# Patient Record
Sex: FEMALE | Race: WHITE
[De-identification: names, ages, dates, MRNs, and addresses within clinical notes are randomized per-mention and may not be internally consistent; named-entity substitution may affect disease eponyms.]

---

## 2017-06-06 ENCOUNTER — HOSPITAL ENCOUNTER (INPATIENT)
Dept: HOSPITAL 18 - NAV ACUTE | Age: 62
LOS: 4 days | Discharge: HOME | DRG: 560 | End: 2017-06-10
Attending: INTERNAL MEDICINE | Admitting: INTERNAL MEDICINE
Payer: MEDICARE

## 2017-06-06 VITALS — BODY MASS INDEX: 61.4 KG/M2

## 2017-06-06 DIAGNOSIS — E66.01: ICD-10-CM

## 2017-06-06 DIAGNOSIS — E11.9: ICD-10-CM

## 2017-06-06 DIAGNOSIS — I11.0: ICD-10-CM

## 2017-06-06 DIAGNOSIS — M79.7: ICD-10-CM

## 2017-06-06 DIAGNOSIS — S32.048D: Primary | ICD-10-CM

## 2017-06-06 DIAGNOSIS — M62.830: ICD-10-CM

## 2017-06-06 DIAGNOSIS — I25.10: ICD-10-CM
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
## 2017-06-06 DIAGNOSIS — I50.30: ICD-10-CM

## 2017-06-06 PROCEDURE — 36416 COLLJ CAPILLARY BLOOD SPEC: CPT

## 2017-06-06 PROCEDURE — 85025 COMPLETE CBC W/AUTO DIFF WBC: CPT

## 2017-06-06 PROCEDURE — 80053 COMPREHEN METABOLIC PANEL: CPT

## 2017-06-06 RX ADMIN — INSULIN LISPRO SCH: 100 INJECTION, SOLUTION INTRAVENOUS; SUBCUTANEOUS at 20:57

## 2017-06-06 RX ADMIN — HYDROCODONE BITARTRATE AND ACETAMINOPHEN PRN TAB: 10; 325 TABLET ORAL at 20:59

## 2017-06-07 LAB
ALBUMIN SERPL BCG-MCNC: 3.8 G/DL (ref 3.5–5)
ALP SERPL-CCNC: 151 U/L (ref 40–150)
ALT SERPL W P-5'-P-CCNC: 20 U/L (ref 8–55)
ANION GAP SERPL CALC-SCNC: 19 MMOL/L (ref 10–20)
AST SERPL-CCNC: 30 U/L (ref 5–34)
BASOPHILS # BLD AUTO: 0.1 THOU/UL (ref 0–0.2)
BASOPHILS NFR BLD AUTO: 1.8 % (ref 0–1)
BILIRUB SERPL-MCNC: 0.6 MG/DL (ref 0.2–1.2)
BUN SERPL-MCNC: 22 MG/DL (ref 9.8–20.1)
CALCIUM SERPL-MCNC: 10 MG/DL (ref 7.8–10.44)
CHLORIDE SERPL-SCNC: 103 MMOL/L (ref 98–107)
CO2 SERPL-SCNC: 23 MMOL/L (ref 22–29)
CREAT CL PREDICTED SERPL C-G-VRATE: 179 ML/MIN (ref 70–130)
EOSINOPHIL # BLD AUTO: 0.2 THOU/UL (ref 0–0.7)
EOSINOPHIL NFR BLD AUTO: 3 % (ref 0–10)
GLOBULIN SER CALC-MCNC: 4.1 G/DL (ref 2.4–3.5)
GLUCOSE SERPL-MCNC: 120 MG/DL (ref 70–105)
HGB BLD-MCNC: 14.3 G/DL (ref 12–16)
LYMPHOCYTES # BLD AUTO: 3.7 THOU/UL (ref 1.2–3.4)
LYMPHOCYTES NFR BLD AUTO: 50 % (ref 21–51)
MCH RBC QN AUTO: 30.9 PG (ref 27–31)
MCV RBC AUTO: 99.7 FL (ref 81–99)
MONOCYTES # BLD AUTO: 0.6 THOU/UL (ref 0.11–0.59)
MONOCYTES NFR BLD AUTO: 8.3 % (ref 0–10)
NEUTROPHILS # BLD AUTO: 2.7 THOU/UL (ref 1.4–6.5)
NEUTROPHILS NFR BLD AUTO: 36.9 % (ref 42–75)
PLATELET # BLD AUTO: 226 THOU/UL (ref 130–400)
POTASSIUM SERPL-SCNC: 4.3 MMOL/L (ref 3.5–5.1)
RBC # BLD AUTO: 4.64 MILL/UL (ref 4.2–5.4)
SODIUM SERPL-SCNC: 141 MMOL/L (ref 136–145)
WBC # BLD AUTO: 7.4 THOU/UL (ref 4.8–10.8)

## 2017-06-07 RX ADMIN — MOMETASONE FUROATE AND FORMOTEROL FUMARATE DIHYDRATE SCH PUFF: 200; 5 AEROSOL RESPIRATORY (INHALATION) at 05:29

## 2017-06-07 RX ADMIN — INSULIN DETEMIR SCH UNIT: 100 INJECTION, SOLUTION SUBCUTANEOUS at 21:55

## 2017-06-07 RX ADMIN — INSULIN LISPRO SCH UNITS: 100 INJECTION, SOLUTION INTRAVENOUS; SUBCUTANEOUS at 21:54

## 2017-06-07 RX ADMIN — INSULIN LISPRO SCH UNITS: 100 INJECTION, SOLUTION INTRAVENOUS; SUBCUTANEOUS at 08:44

## 2017-06-07 RX ADMIN — HYDROCODONE BITARTRATE AND ACETAMINOPHEN PRN TAB: 10; 325 TABLET ORAL at 05:29

## 2017-06-07 RX ADMIN — MOMETASONE FUROATE AND FORMOTEROL FUMARATE DIHYDRATE SCH PUFF: 200; 5 AEROSOL RESPIRATORY (INHALATION) at 18:05

## 2017-06-07 RX ADMIN — HYDROCODONE BITARTRATE AND ACETAMINOPHEN PRN TAB: 10; 325 TABLET ORAL at 21:55

## 2017-06-07 RX ADMIN — INSULIN LISPRO SCH UNITS: 100 INJECTION, SOLUTION INTRAVENOUS; SUBCUTANEOUS at 15:04

## 2017-06-08 RX ADMIN — MOMETASONE FUROATE AND FORMOTEROL FUMARATE DIHYDRATE SCH PUFF: 200; 5 AEROSOL RESPIRATORY (INHALATION) at 21:12

## 2017-06-08 RX ADMIN — INSULIN DETEMIR SCH UNIT: 100 INJECTION, SOLUTION SUBCUTANEOUS at 21:16

## 2017-06-08 RX ADMIN — INSULIN LISPRO SCH UNITS: 100 INJECTION, SOLUTION INTRAVENOUS; SUBCUTANEOUS at 14:44

## 2017-06-08 RX ADMIN — HYDROCODONE BITARTRATE AND ACETAMINOPHEN PRN TAB: 10; 325 TABLET ORAL at 23:01

## 2017-06-08 RX ADMIN — INSULIN LISPRO SCH UNITS: 100 INJECTION, SOLUTION INTRAVENOUS; SUBCUTANEOUS at 21:14

## 2017-06-08 RX ADMIN — INSULIN LISPRO SCH UNITS: 100 INJECTION, SOLUTION INTRAVENOUS; SUBCUTANEOUS at 08:32

## 2017-06-08 RX ADMIN — HYDROCODONE BITARTRATE AND ACETAMINOPHEN PRN TAB: 10; 325 TABLET ORAL at 11:40

## 2017-06-08 RX ADMIN — MOMETASONE FUROATE AND FORMOTEROL FUMARATE DIHYDRATE SCH PUFF: 200; 5 AEROSOL RESPIRATORY (INHALATION) at 06:20

## 2017-06-08 RX ADMIN — HYDROCODONE BITARTRATE AND ACETAMINOPHEN PRN TAB: 10; 325 TABLET ORAL at 17:01

## 2017-06-08 NOTE — HP
HISTORY OF PRESENT ILLNESS:  The patient is a 61-year-old white female with a history of a recent fa
ll with subsequent recurrent back pain and evaluation finding a compression fracture of L4.  She gayathri
led on conservative treatment at home, required admission to [facility] on 05/28/2017 because of sev
ere pain.  She has comorbidities of morbid obesity, weighing greater than 300 pounds and systemic poornima
pus erythematosus and fibromyalgia with chronic pain.  She also has type 2 diabetes, hypertension, d
iastolic congestive heart failure, and coronary artery disease.  She, however, had been doing very w
ell, living alone at home with her mother.  Prior to the fall when she slipped out of her wheelchair
, she normally is ambulatory in her wheelchair and back and forth able to transfer, but because of h
er severe pain, has been having difficulty that has required treatment with fentanyl patch and oral 
hydrocodone for control of pain.  She is improving now with therapy and feels that she is comfortabl
e at rest and is able to transfer and is hoping to be discharged home in the next several days after
 therapy.

 

PAST MEDICAL HISTORY:  As mentioned above is remarkable for coronary artery disease, but with no ang
ioplasty or bypass.  She also has a history of congestive heart failure and is well controlled on me
dications, history of asthma with no recent exacerbation, the above-mentioned history of lupus and f
ibromyalgia with occasional steroid pulses for exacerbation none recently, above-mentioned history a
lso has severe morbid obesity, greater than 400 pound weight in the past, now 320, type 2 diabetes a
s well as hyperlipidemia and hypertension.

 

PAST SURGICAL HISTORY:  Positive for bilateral tubal, cholecystectomy, and hernia repair.

 

SOCIAL HISTORY:  She lives, like I said, at home with her mother.  She is a nonsmoker, nondrinker.

 

REVIEW OF SYSTEMS:  HEENT:  She denies change in vision or hearing, hoarseness or dysphagia.  Pulmon
gaby:  She denies cough, sputum production, wheezing, shortness of breath at this time.  Cardiovascul
ar:  She denies any chest pain, palpitations, orthopnea, paroxysmal nocturnal dyspnea.  Gastrointest
inal:  She denies nausea, vomiting, diarrhea, constipation, abdominal pain.  Genitourinary:  Denies 
dysuria, hematuria, and nocturia.  Musculoskeletal:  She has severe lower back pain at rest and with
 movement, which has improved with fentanyl patch and Norco, but is still present.  She has no numbn
ess or tingling in her arms or legs or radicular pain.  Neurologic:  She denies any change in vision
 or hearing, speech or swallowing.

 

LABORATORY DATA:  Shows her to have white count 7400, hematocrit 46, hemoglobin 14, sodium 141, pota
ssium 4.3, chloride 103, bicarbonate 23, BUN 22, creatinine 0.87, glucose 120, calcium 10.0, total b
ilirubin 0.6, AST 30, ALT 20, alkaline phosphatase today is 151, albumin 3.8, and globulin 4.1.

 

PHYSICAL EXAMINATION:

GENERAL:  Shows morbidly obese white female lying in the bed, appears to be in no distress at this t
rodolfo, and comfortable in her bed.

VITAL SIGNS:  Shown to have blood pressure of 120/60, temperature is 96.2, pulse 63, respirations 20
, O2 sats 92% on 2.5 liters.

HEENT:  Pupils are equal, round, and react to light and accommodation.  Sclerae are anicteric.  Conj
unctivae pale.  Oral mucous membranes well hydrated.

NECK:  Supple.  There are no nodes or masses.  JVP is not elevated.

LUNGS:  Clear.

CARDIAC:  Examination showed regular rhythm.  No gallops or murmurs.

ABDOMEN:  Morbidly obese, but no masses or organomegaly.  Good bowel sounds.

SKIN/EXTREMITIES:  Show no edema, clubbing, cyanosis.  There are some palpable spasms over the lower
 back and tenderness to palpation on the lower lumbar spine.

NEUROLOGICAL:  Cranial nerves intact.  Deep tendon reflexes 2+ equal, absent Babinskis.

 

ASSESSMENT:  The patient is a 61-year-old morbidly obese white female with multiple comorbidities of
 hypertension, diabetes, lupus and fibromyalgia as well as asthma who appears to be stable at this t
rodolfo except for severe back pain which is improved with a fentanyl patch and Norco.  She states that 
prior to this she was able to live independently with her mother at home, ambulating with a wheelcha
ir and able to transfer without difficulty.  She feels that she is progressing back with that and wi
ll continue on her home medications of atenolol 100 mg daily, Symbicort 160/4.5 two puffs twice shraddha
y, amlodipine 10 mg daily, albuterol inhaler 2 puffs every 6 h., vitamin D 5000 units daily, Nexium 
over the counter 20 mg daily, furosemide 20 mg daily, gabapentin 600 three times daily, hydrocodone 
10/325 1-2 every 6 hours, lispro 5 units subcu t.i.d., Levemir 25 units nightly, oxybutynin 5 mg kalyan
ly, Paxil 40 mg daily, pravastatin 40 mg daily, Ambien 10 mg as needed and a new medication of fenta
nyl 12 mcg patch every 3 days.  She will be started on physical therapy and occupational therapy to 
hopefully improve to the point that she can be discharged home.  Maintain ADLs in the next several d
ays.  She will be monitored closely with Accu-Cheks and consideration may be given to Lovenox inject
ions if she does not move better in the next several days.

## 2017-06-08 NOTE — PRG
DATE OF SERVICE:  06/08/2017

 

SUBJECTIVE:  The patient lying in bed, resting well with a Fentanyl patch and hydrocodone 10/325 two
 q.6 h. She states she had significant pain yesterday on the 1 hydrocodone, but had relief with 2 an
d therefore is ready for therapy today.  She has no other complaints of nausea, vomiting, chest pain
, shortness of breath or palpitations.

 

OBJECTIVE:  Blood pressure 177/80, O2 sats is 97%, respirations 18, temperature 96, pulse 56.  Lungs
 are clear.  Cardiac examination showed regular rhythm.  Abdomen is soft and nontender.  Back shows 
palpable spasms and tenderness to palpation of the lumbar spine.

 

ASSESSMENT:

1.  Compression fracture of L4 with current muscle spasms, controlled with hydrocodone and fentanyl 
patch.  Will start on tizanidine 4 mg twice daily to see if this would help with the spasms.

2.  Type 2 diabetes, controlled at this time with Accu-Cheks running from 130-168 on home medication
s of Humalog before meals and Levemir at night.

3.  Fibromyalgia and lupus, asymptomatic at this time.

4.  Diastolic congestive heart failure and hypertension, well controlled.

 

PLAN:  Continue on increased hydrocodone 10/325 two every 6 hours as needed, continued Fentanyl patc
h, starts tizanidine 4 mg twice daily, start Nystatin cream to perineal area for monilia.

## 2017-06-09 RX ADMIN — INSULIN LISPRO SCH UNITS: 100 INJECTION, SOLUTION INTRAVENOUS; SUBCUTANEOUS at 14:30

## 2017-06-09 RX ADMIN — INSULIN LISPRO SCH UNITS: 100 INJECTION, SOLUTION INTRAVENOUS; SUBCUTANEOUS at 20:50

## 2017-06-09 RX ADMIN — INSULIN LISPRO SCH UNITS: 100 INJECTION, SOLUTION INTRAVENOUS; SUBCUTANEOUS at 08:40

## 2017-06-09 RX ADMIN — MOMETASONE FUROATE AND FORMOTEROL FUMARATE DIHYDRATE SCH PUFF: 200; 5 AEROSOL RESPIRATORY (INHALATION) at 18:12

## 2017-06-09 RX ADMIN — MOMETASONE FUROATE AND FORMOTEROL FUMARATE DIHYDRATE SCH PUFF: 200; 5 AEROSOL RESPIRATORY (INHALATION) at 06:29

## 2017-06-09 RX ADMIN — HYDROCODONE BITARTRATE AND ACETAMINOPHEN PRN TAB: 10; 325 TABLET ORAL at 08:36

## 2017-06-09 RX ADMIN — HYDROCODONE BITARTRATE AND ACETAMINOPHEN PRN TAB: 10; 325 TABLET ORAL at 20:49

## 2017-06-09 RX ADMIN — INSULIN DETEMIR SCH UNIT: 100 INJECTION, SOLUTION SUBCUTANEOUS at 20:49

## 2017-06-09 RX ADMIN — HYDROCODONE BITARTRATE AND ACETAMINOPHEN PRN TAB: 10; 325 TABLET ORAL at 14:29

## 2017-06-09 NOTE — PRG
DATE OF SERVICE:  06/09/2017

 

SUBJECTIVE:  Patient is an unfortunate 61-year-old white female with multiple medical problems inclu
ding morbid obesity, type 2 diabetes, fibromyalgia, lupus, diastolic congestive heart failure and hy
pertension, who has fallen and suffered a fracture of L4 with recurrent muscle spasms, treated at [facility], evaluated by Neurosurgery with no surgical relief.  Possible discharge to the SNF here on 
fentanyl patch and hydrocodone 10/325 two q.6 h.  She has been on this, has been cooperating with kana coffman and in fact, therapy states that she is stable for discharge and she walked 100 feet with a ro
lling walker with only standby assistance, independent and is ready to be discharge to her home.  Arya niño will be discharged tomorrow to her home.  She has prescriptions for fentanyl and hydrocodone given
 by her previous physician.  Prescription for tizanidine has been sent to her pharmacy and she has h
er other home medications.

 

OBJECTIVE:

VITAL SIGNS:  At this time appeared to be stable with O2 sat of 92% at 2.5 L, respirations 20, pulse
 57, afebrile, blood pressure 110/55.

LUNGS:  Clear.

CARDIAC:  Regular rhythm.

ABDOMEN:  Soft and nontender.

SKIN and EXTREMITIES:  Show morbid obesity with tenderness in the lower back, but the patient is amb
ulating.

 

ASSESSMENT:

1.  Compression fracture of L4, controlled pain.

2.  Increasing physical conditioning with ability to maintain ADLs.

3.  Chronic type 2 diabetes, well controlled with Accu-Cheks ranging from 133 to 152.

4.  Hypertension, controlled to goal.

5.  Diastolic congestive heart failure, appears compensated.

 

PLAN:  Discharge in the a.m.  Follow up with PCP next week.

## 2017-06-10 VITALS — SYSTOLIC BLOOD PRESSURE: 128 MMHG | TEMPERATURE: 97.2 F | DIASTOLIC BLOOD PRESSURE: 57 MMHG

## 2017-06-10 RX ADMIN — HYDROCODONE BITARTRATE AND ACETAMINOPHEN PRN TAB: 10; 325 TABLET ORAL at 06:52

## 2017-06-10 RX ADMIN — MOMETASONE FUROATE AND FORMOTEROL FUMARATE DIHYDRATE SCH PUFF: 200; 5 AEROSOL RESPIRATORY (INHALATION) at 06:28

## 2017-06-10 RX ADMIN — INSULIN LISPRO SCH UNITS: 100 INJECTION, SOLUTION INTRAVENOUS; SUBCUTANEOUS at 08:04

## 2018-01-28 ENCOUNTER — HOSPITAL ENCOUNTER (EMERGENCY)
Dept: HOSPITAL 92 - ERS | Age: 63
Discharge: HOME | End: 2018-01-28
Payer: MEDICARE

## 2018-01-28 DIAGNOSIS — E78.5: ICD-10-CM

## 2018-01-28 DIAGNOSIS — F32.9: ICD-10-CM

## 2018-01-28 DIAGNOSIS — I25.10: ICD-10-CM

## 2018-01-28 DIAGNOSIS — E11.9: ICD-10-CM

## 2018-01-28 DIAGNOSIS — F41.9: ICD-10-CM

## 2018-01-28 DIAGNOSIS — J45.909: ICD-10-CM

## 2018-01-28 DIAGNOSIS — R07.2: Primary | ICD-10-CM

## 2018-01-28 LAB
ALBUMIN SERPL BCG-MCNC: 3.6 G/DL (ref 3.5–5)
ALP SERPL-CCNC: 119 U/L (ref 40–150)
ALT SERPL W P-5'-P-CCNC: 15 U/L (ref 8–55)
ANION GAP SERPL CALC-SCNC: 14 MMOL/L (ref 10–20)
APTT PPP: 29.5 SEC (ref 22.9–36.1)
AST SERPL-CCNC: 36 U/L (ref 5–34)
BASOPHILS # BLD AUTO: 0.1 THOU/UL (ref 0–0.2)
BASOPHILS NFR BLD AUTO: 1.2 % (ref 0–1)
BILIRUB SERPL-MCNC: 0.4 MG/DL (ref 0.2–1.2)
BUN SERPL-MCNC: 18 MG/DL (ref 9.8–20.1)
CALCIUM SERPL-MCNC: 9.3 MG/DL (ref 7.8–10.44)
CHLORIDE SERPL-SCNC: 101 MMOL/L (ref 98–107)
CK MB SERPL-MCNC: 0.8 NG/ML (ref 0–6.6)
CK SERPL-CCNC: 59 U/L (ref 29–168)
CO2 SERPL-SCNC: 28 MMOL/L (ref 22–29)
CREAT CL PREDICTED SERPL C-G-VRATE: 0 ML/MIN (ref 70–130)
EOSINOPHIL # BLD AUTO: 0.4 THOU/UL (ref 0–0.7)
EOSINOPHIL NFR BLD AUTO: 4.7 % (ref 0–10)
GLOBULIN SER CALC-MCNC: 3.6 G/DL (ref 2.4–3.5)
GLUCOSE SERPL-MCNC: 151 MG/DL (ref 70–105)
HGB BLD-MCNC: 13.7 G/DL (ref 12–16)
INR PPP: 1
LIPASE SERPL-CCNC: 33 U/L (ref 8–78)
LYMPHOCYTES # BLD: 3.6 THOU/UL (ref 1.2–3.4)
LYMPHOCYTES NFR BLD AUTO: 44.9 % (ref 21–51)
MCH RBC QN AUTO: 33.9 PG (ref 27–31)
MCV RBC AUTO: 101 FL (ref 81–99)
MONOCYTES # BLD AUTO: 0.6 THOU/UL (ref 0.11–0.59)
MONOCYTES NFR BLD AUTO: 6.9 % (ref 0–10)
NEUTROPHILS # BLD AUTO: 3.4 THOU/UL (ref 1.4–6.5)
NEUTROPHILS NFR BLD AUTO: 42.4 % (ref 42–75)
PLATELET # BLD AUTO: 212 THOU/UL (ref 130–400)
POTASSIUM SERPL-SCNC: 3.8 MMOL/L (ref 3.5–5.1)
PROTHROMBIN TIME: 13 SEC (ref 12–14.7)
RBC # BLD AUTO: 4.04 MILL/UL (ref 4.2–5.4)
SODIUM SERPL-SCNC: 139 MMOL/L (ref 136–145)
TROPONIN I SERPL DL<=0.01 NG/ML-MCNC: (no result) NG/ML (ref ?–0.03)
WBC # BLD AUTO: 8.1 THOU/UL (ref 4.8–10.8)

## 2018-01-28 PROCEDURE — 85025 COMPLETE CBC W/AUTO DIFF WBC: CPT

## 2018-01-28 PROCEDURE — 36415 COLL VENOUS BLD VENIPUNCTURE: CPT

## 2018-01-28 PROCEDURE — 71045 X-RAY EXAM CHEST 1 VIEW: CPT

## 2018-01-28 PROCEDURE — 93005 ELECTROCARDIOGRAM TRACING: CPT

## 2018-01-28 PROCEDURE — 80053 COMPREHEN METABOLIC PANEL: CPT

## 2018-01-28 PROCEDURE — 83690 ASSAY OF LIPASE: CPT

## 2018-01-28 PROCEDURE — 84484 ASSAY OF TROPONIN QUANT: CPT

## 2018-01-28 PROCEDURE — 85730 THROMBOPLASTIN TIME PARTIAL: CPT

## 2018-01-28 PROCEDURE — 85610 PROTHROMBIN TIME: CPT

## 2018-01-28 PROCEDURE — 82553 CREATINE MB FRACTION: CPT

## 2018-01-28 NOTE — RAD
CHEST 1 VIEW:

 

Date:  01/28/18 

 

HISTORY: 

Chest pain. 

 

COMPARISON:  

03/31/17. 

 

FINDINGS:

Portable upright chest demonstrates normal cardiac silhouette. Pulmonary vessels are normal. Costophr
enic angles are clear. No mass. No consolidation. No pneumothorax or osseous abnormalities. 

 

IMPRESSION: 

Mild pulmonary vascular prominence. Correlate for volume overload. 

 

 

POS: [facility]

## 2018-07-22 ENCOUNTER — HOSPITAL ENCOUNTER (INPATIENT)
Dept: HOSPITAL 92 - ERS | Age: 63
LOS: 2 days | Discharge: HOME | DRG: 917 | End: 2018-07-24
Attending: INTERNAL MEDICINE | Admitting: INTERNAL MEDICINE
Payer: MEDICARE

## 2018-07-22 VITALS — BODY MASS INDEX: 57.6 KG/M2

## 2018-07-22 DIAGNOSIS — Z91.19: ICD-10-CM

## 2018-07-22 DIAGNOSIS — I50.32: ICD-10-CM

## 2018-07-22 DIAGNOSIS — E03.9: ICD-10-CM

## 2018-07-22 DIAGNOSIS — E11.65: ICD-10-CM

## 2018-07-22 DIAGNOSIS — J96.11: ICD-10-CM

## 2018-07-22 DIAGNOSIS — G89.4: ICD-10-CM

## 2018-07-22 DIAGNOSIS — E66.2: ICD-10-CM

## 2018-07-22 DIAGNOSIS — G92: ICD-10-CM

## 2018-07-22 DIAGNOSIS — E78.5: ICD-10-CM

## 2018-07-22 DIAGNOSIS — Z99.81: ICD-10-CM

## 2018-07-22 DIAGNOSIS — M79.7: ICD-10-CM

## 2018-07-22 DIAGNOSIS — J96.12: ICD-10-CM

## 2018-07-22 DIAGNOSIS — I11.0: ICD-10-CM

## 2018-07-22 DIAGNOSIS — F41.9: ICD-10-CM

## 2018-07-22 DIAGNOSIS — J40: ICD-10-CM

## 2018-07-22 DIAGNOSIS — I25.10: ICD-10-CM

## 2018-07-22 DIAGNOSIS — T40.2X1A: Primary | ICD-10-CM

## 2018-07-22 DIAGNOSIS — E11.40: ICD-10-CM

## 2018-07-22 DIAGNOSIS — J44.9: ICD-10-CM

## 2018-07-22 DIAGNOSIS — K21.9: ICD-10-CM

## 2018-07-22 DIAGNOSIS — F32.9: ICD-10-CM
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
## 2018-07-22 DIAGNOSIS — M32.9: ICD-10-CM

## 2018-07-22 LAB
ALBUMIN SERPL BCG-MCNC: 3.5 G/DL (ref 3.5–5)
ALP SERPL-CCNC: 159 U/L (ref 40–150)
ALT SERPL W P-5'-P-CCNC: 16 U/L (ref 8–55)
ANALYZER IN CARDIO: (no result)
ANION GAP SERPL CALC-SCNC: 12 MMOL/L (ref 10–20)
APAP SERPL-MCNC: (no result) MCG/ML (ref 10–30)
AST SERPL-CCNC: 34 U/L (ref 5–34)
BASE EXCESS STD BLDA CALC-SCNC: 6 MEQ/L
BASOPHILS # BLD AUTO: 0.1 THOU/UL (ref 0–0.2)
BASOPHILS NFR BLD AUTO: 0.7 % (ref 0–1)
BILIRUB SERPL-MCNC: 0.5 MG/DL (ref 0.2–1.2)
BUN SERPL-MCNC: 20 MG/DL (ref 9.8–20.1)
CA-I BLDA-SCNC: 1.2 MMOL/L (ref 1.12–1.3)
CALCIUM SERPL-MCNC: 9.3 MG/DL (ref 7.8–10.44)
CHLORIDE SERPL-SCNC: 100 MMOL/L (ref 98–107)
CK MB SERPL-MCNC: 0.9 NG/ML (ref 0–6.6)
CO2 SERPL-SCNC: 31 MMOL/L (ref 22–29)
CREAT CL PREDICTED SERPL C-G-VRATE: 0 ML/MIN (ref 70–130)
DRUG SCREEN CUTOFF: (no result)
EOSINOPHIL # BLD AUTO: 0.4 THOU/UL (ref 0–0.7)
EOSINOPHIL NFR BLD AUTO: 4.3 % (ref 0–10)
GLOBULIN SER CALC-MCNC: 3.7 G/DL (ref 2.4–3.5)
GLUCOSE SERPL-MCNC: 303 MG/DL (ref 70–105)
GLUCOSE UR STRIP-MCNC: 500 MG/DL
HCO3 BLDA-SCNC: 33.3 MEQ/L (ref 22–28)
HCT VFR BLDA CALC: 38.8 % (ref 36–47)
HGB BLD-MCNC: 12.1 G/DL (ref 12–16)
HGB BLDA-MCNC: 11.9 G/DL (ref 12–16)
LIPASE SERPL-CCNC: 15 U/L (ref 8–78)
LYMPHOCYTES # BLD: 3.5 THOU/UL (ref 1.2–3.4)
LYMPHOCYTES NFR BLD AUTO: 41.9 % (ref 21–51)
MAGNESIUM SERPL-MCNC: 1.7 MG/DL (ref 1.6–2.6)
MCH RBC QN AUTO: 33.1 PG (ref 27–31)
MCV RBC AUTO: 97.8 FL (ref 78–98)
MEDTOX CONTROL LINE VALID?: (no result)
MEDTOX READER #: (no result)
MONOCYTES # BLD AUTO: 0.6 THOU/UL (ref 0.11–0.59)
MONOCYTES NFR BLD AUTO: 6.9 % (ref 0–10)
NEUTROPHILS # BLD AUTO: 3.9 THOU/UL (ref 1.4–6.5)
NEUTROPHILS NFR BLD AUTO: 46.2 % (ref 42–75)
O2 A-A PPRESDIFF RESPIRATORY: 48.72 MM[HG] (ref 0–20)
PCO2 BLDA: 62.1 MMHG (ref 35–45)
PH BLDA: 7.35 [PH] (ref 7.35–7.45)
PLATELET # BLD AUTO: 215 THOU/UL (ref 130–400)
PO2 BLDA: 73.3 MMHG (ref 80–?)
POTASSIUM SERPL-SCNC: 4.2 MMOL/L (ref 3.5–5.1)
RBC # BLD AUTO: 3.66 MILL/UL (ref 4.2–5.4)
SALICYLATES SERPL-MCNC: (no result) MG/DL (ref 15–30)
SODIUM SERPL-SCNC: 139 MMOL/L (ref 136–145)
SP GR UR STRIP: 1.04 (ref 1–1.04)
SPECIMEN DRAWN FROM PATIENT: (no result)
TROPONIN I SERPL DL<=0.01 NG/ML-MCNC: (no result) NG/ML (ref ?–0.03)
WBC # BLD AUTO: 8.4 THOU/UL (ref 4.8–10.8)

## 2018-07-22 PROCEDURE — 82553 CREATINE MB FRACTION: CPT

## 2018-07-22 PROCEDURE — 5A09457 ASSISTANCE WITH RESPIRATORY VENTILATION, 24-96 CONSECUTIVE HOURS, CONTINUOUS POSITIVE AIRWAY PRESSURE: ICD-10-PCS | Performed by: INTERNAL MEDICINE

## 2018-07-22 PROCEDURE — 94660 CPAP INITIATION&MGMT: CPT

## 2018-07-22 PROCEDURE — 36416 COLLJ CAPILLARY BLOOD SPEC: CPT

## 2018-07-22 PROCEDURE — 80048 BASIC METABOLIC PNL TOTAL CA: CPT

## 2018-07-22 PROCEDURE — 87040 BLOOD CULTURE FOR BACTERIA: CPT

## 2018-07-22 PROCEDURE — 71045 X-RAY EXAM CHEST 1 VIEW: CPT

## 2018-07-22 PROCEDURE — 83690 ASSAY OF LIPASE: CPT

## 2018-07-22 PROCEDURE — 87086 URINE CULTURE/COLONY COUNT: CPT

## 2018-07-22 PROCEDURE — 85025 COMPLETE CBC W/AUTO DIFF WBC: CPT

## 2018-07-22 PROCEDURE — 81003 URINALYSIS AUTO W/O SCOPE: CPT

## 2018-07-22 PROCEDURE — 80307 DRUG TEST PRSMV CHEM ANLYZR: CPT

## 2018-07-22 PROCEDURE — 84484 ASSAY OF TROPONIN QUANT: CPT

## 2018-07-22 PROCEDURE — 83735 ASSAY OF MAGNESIUM: CPT

## 2018-07-22 PROCEDURE — 82805 BLOOD GASES W/O2 SATURATION: CPT

## 2018-07-22 PROCEDURE — 94760 N-INVAS EAR/PLS OXIMETRY 1: CPT

## 2018-07-22 PROCEDURE — A4216 STERILE WATER/SALINE, 10 ML: HCPCS

## 2018-07-22 PROCEDURE — C9113 INJ PANTOPRAZOLE SODIUM, VIA: HCPCS

## 2018-07-22 PROCEDURE — 93005 ELECTROCARDIOGRAM TRACING: CPT

## 2018-07-22 PROCEDURE — 70450 CT HEAD/BRAIN W/O DYE: CPT

## 2018-07-22 PROCEDURE — 80053 COMPREHEN METABOLIC PANEL: CPT

## 2018-07-22 PROCEDURE — 83880 ASSAY OF NATRIURETIC PEPTIDE: CPT

## 2018-07-22 PROCEDURE — 82140 ASSAY OF AMMONIA: CPT

## 2018-07-22 PROCEDURE — 84443 ASSAY THYROID STIM HORMONE: CPT

## 2018-07-22 PROCEDURE — 80306 DRUG TEST PRSMV INSTRMNT: CPT

## 2018-07-22 PROCEDURE — 36415 COLL VENOUS BLD VENIPUNCTURE: CPT

## 2018-07-22 RX ADMIN — INSULIN GLARGINE SCH MLS: 100 INJECTION, SOLUTION SUBCUTANEOUS at 20:58

## 2018-07-22 RX ADMIN — Medication SCH ML: at 20:59

## 2018-07-22 NOTE — RAD
FRONTAL VIEW CHEST:

 

COMPARISON: 

1/28/18.

 

INDICATION: 

Altered mental status.

 

FINDINGS: 

The cardiac silhouette is enlarged.  There is limited visualization at the lung base.  The upper to m
id lung zones are clear bilaterally.  There is vascular prominence.

 

IMPRESSION: 

1.  Congestive heart failure.

 

2.  Limited visualization at the lung bases.

 

POS: [facility]

## 2018-07-22 NOTE — CT
CT OF THE BRAIN WITHOUT CONTRAST:

 

INDICATION: 

Altered mental status and lethargy.

 

COMPARISON: 

Prior exam dated 5/18/17.

 

FINDINGS: 

No acute infarct, hemorrhage, or hydrocephalus is present.  The septum pellucidum and third ventricle
 are midline.  Mastoid air cells are clear.  Visualized sinus is clear.  The skull is intact.

 

IMPRESSION: 

No acute intracranial abnormality.

 

POS: [facility]

## 2018-07-22 NOTE — HP
PRIMARY CARE PHYSICIAN: Malick Delgado M.D.

 

REASON FOR ADMISSION:  Acute encephalopathy.

 

HISTORY OF PRESENT ILLNESS:  A 62-year-old female who has underlying history of morbid obesity and sl
eep apnea who is noncompliant with her CPAP use at home.  She took extra Norco during night time for 
her pain and subsequently she was more lethargic.  She was less responsive and that is why she was br
ought to emergency room for evaluation.  Unfortunately, this patient is lethargic, sleepy and she is 
not able to provide any history, but history obtained from ER physician.  In the emergency room, deja
ent had blood gas done which showed CO2 retention.  The patient was kept on BiPAP.  After that, echo
nt was slightly started responding.  Initially in the emergency room, Narcan was tried but that did n
ot significantly change her lethargy and they tried ammonia after that the patient slightly woke up, 
but again she went to sleep. At this point because of her lethargy and sleepiness, it was not safe fo
r her discharge from the ER to the home and the patient is requiring BiPAP and that is why we are goi
ng to admit this patient in [facility].

 

REVIEW OF SYSTEMS:  All review of systems tried to review the patient, but unable to review at this p
oint because of altered mental status.

 

PAST MEDICAL HISTORY:  Chronic respiratory failure requiring home oxygen, morbid obesity with obesity
 hypoventilation syndrome, obstructive sleep apnea noncompliance with the CPAP machine, diabetes type
 2, chronic pain disorder, gastroesophageal reflux disease, dyslipidemia, asthma, overactive bladder,
 chronic insomnia, chronic diastolic heart failure, secondary to right-sided heart failure from cor p
ulmonale from obesity hypoventilation syndrome, peripheral neuropathy.

 

PAST SURGICAL HISTORY:  Cholecystectomy, tubal ligation, hernia repair, wrist surgery, tummy tuck payton
michelle, sciatic nerve resection.

 

PAST PSYCHIATRIC HISTORY:  Insomnia, chronic pain disorder, anxiety and depression.

 

FAMILY HISTORY:  Positive for COPD to her mother.  No family history of coronary artery disease, stro
ke or cancer.

 

SOCIAL HISTORY:  Patient lives by herself.  Her mother checks on weekdays.  No history of tobacco, al
cohol or illicit drug abuse.

 

ALLERGIES:  CODEINE, DEMEROL, REGLAN, MORPHINE.

 

CODE STATUS:  FULL CODE.  Patient's mother is surrogate decision maker.

 

CURRENT HOME MEDICATIONS:  Paxil 40 mg p.o. daily, Lasix 20 mg p.o. daily, Norco 10 one tablet q.4 ho
urly p.r.n., gabapentin 600 mg twice daily, Synthroid 25 mcg p.o. daily, atenolol 100 mg p.o. daily, 
amlodipine 10 mg p.o. daily, pravastatin 40 mg p.o. at bedtime, Protonix 40 mg twice daily, Phenergan
 25 mg 3 times daily p.r.n.

 

EMERGENCY ROOM COURSE:  Patient is given Lasix 60 mg, IV fluid 500 mL, Narcan 0.4 mg.

 

PHYSICAL EXAMINATION:

VITAL SIGNS:  On arrival, blood pressure 112/81, pulse 60, respiratory rate 13, saturation 91% on iram
m air, weight 155.1 kilograms, temperature 98.8.

GENERAL:  The patient is currently lethargic, sleepy, only responsive to noxious stimuli.  She is on 
BiPAP.

HEAD:  Normocephalic, atraumatic.

EYES:  Pupils round, reactive to light.  Extraocular muscle intact.

ENT:  Oropharynx within normal limits.  Moist mucous membranes.  No oral lesion, no pharyngeal erythe
ma, no exudate.

NECK:  Supple, no JVD, short neck.  Difficult to assess JVD though.

LUNGS:  Clear to auscultation anteriorly without any rhonchi or rales.

CARDIAC:  S1, S2 appears regular.  No murmur, no gallop, no rub.

ABDOMEN:  Morbid obesity limiting examination.  The patient has soft abdomen.  No distention.  Bowel 
sounds present, no organomegaly, no mass.

BACK:  Examination unremarkable, no CVA tenderness.

EXTREMITIES:  Upper extremity passive movements of all joints are normal.  Lower extremity passive mo
vements of all joints are normal.

NEUROLOGIC:  Detailed neurological examination is not possible because of altered mental status.  Pat
ient is slightly sleepy and lethargic on BiPAP.

 

IMAGING DATA AND SIGNIFICANT DIAGNOSTIC LABORATORY DATA:  EKG showing normal sinus rhythm, low voltag
e QRS complex.  CT brain based on my review, no acute intracranial process.  Chest x-ray reported as 
finding suggestive of congestive heart failure, cardiomegaly.  CBC:  WBC 8.4, hemoglobin 12.1, platel
et 215.  ABG:  PH 7.35, pCO2 62.1, pO2 of 73.3, bicarbonate 33.3, saturation 95%.  BMP:  Sodium 139, 
potassium 4.2, chloride 100, carbon dioxide 31, anion gap 12, BUN 20, creatinine 1.03, glucose 303, c
alcium 9.3, magnesium 1.7.  LFT:  AST 34, ALT 16, alkaline phosphatase 159, albumin 3.5, lipase 15.  
TSH 8.69.  BNP 53.0.  Cardiac enzymes negative.  Ammonia level 57.  Serum drug screen; salicylate lev
el less than 8.  Acetaminophen level less than 6.  Blood alcohol level less than 10.

 

ASSESSMENT AND PLAN/IMPRESSION:

1.  Acute encephalopathy, likely due to CO2 narcosis or pain medication.  Patient's CT brain is negat
khris for any acute intracranial process.  Her CBC is normal.  BMP showing hyperglycemia and her TSH is
 also within normal limit.  Her CO2 is also elevated.  At this point, we will hold on all sedative me
dications.  We will continue with BiPAP and we will closely monitor overnight in IMCU.  Pulmonary dolores
up will be consulted for opinion.

2.  Morbid obesity with obesity hypoventilation syndrome/obstructive sleep apnea.  Patient has chroni
c CO2 retention and she is noncompliant with continuous positive airway pressure machine.  Pulmonary 
group was consulted.  The patient needs to use continuous positive airway pressure machine at home al
l the time to prevent recurrent admission for CO2 narcosis.

3.  Chronic respiratory acidosis with metabolic compensation from problem #2.

4.  Chronic diastolic heart failure.  Currently, patient appears to be euvolemic.  Her chest x-ray sh
ows pulmonary vascular congestion, but clinical examination is showing euvolemic status.  We will con
tinue Lasix 20 mg p.o. daily.  Patient is given Lasix 60 mg in the emergency room.

5.  Hypothyroidism.  TSH is elevated and that is why we will increase dose of levothyroxine 250 mcg p
.o. daily.

6.  Dyslipidemia.  We will continue pravastatin 40 mg p.o. at bedtime.

7.  Hypertension.  We will continue atenolol 100 mg p.o. daily and amlodipine 10 mg p.o. daily.  If p
ulse rate is less than 60, then we will hold atenolol.  If blood pressure is less than 120, then we w
ill hold amlodipine as well.

8.  Gastroesophageal reflux disease.  We will continue Protonix 40 mg p.o. daily.

9.  Diabetes type 2, not well controlled.  We will continue with insulin as per sliding scale protoco
l.  We will continue Levemir 25 units subcu at bedtime.

10.  Asthma.  We will continue DuoNeb q.6 hourly p.r.n. basis.

11.  Deep venous thrombosis prophylaxis.  Lovenox 40 mg subcu daily.

12.  Gastrointestinal prophylaxis, Protonix 40 mg p.o. daily.

 

CODE STATUS:  The patient is FULL CODE.  Patient's mother is surrogate decision maker.

 

Disposition plan based on clinical course.  We are expecting patient's stay in hospital more than 2 m
idnights.  Plan of care discussed with the patient in detail.

## 2018-07-23 LAB
ANION GAP SERPL CALC-SCNC: 11 MMOL/L (ref 10–20)
BASOPHILS # BLD AUTO: 0 THOU/UL (ref 0–0.2)
BASOPHILS NFR BLD AUTO: 0.5 % (ref 0–1)
BUN SERPL-MCNC: 15 MG/DL (ref 9.8–20.1)
CALCIUM SERPL-MCNC: 9.3 MG/DL (ref 7.8–10.44)
CHLORIDE SERPL-SCNC: 97 MMOL/L (ref 98–107)
CO2 SERPL-SCNC: 37 MMOL/L (ref 22–29)
CREAT CL PREDICTED SERPL C-G-VRATE: 185 ML/MIN (ref 70–130)
EOSINOPHIL # BLD AUTO: 0.3 THOU/UL (ref 0–0.7)
EOSINOPHIL NFR BLD AUTO: 3.3 % (ref 0–10)
GLUCOSE SERPL-MCNC: 196 MG/DL (ref 70–105)
HGB BLD-MCNC: 12.3 G/DL (ref 12–16)
LYMPHOCYTES # BLD: 3.1 THOU/UL (ref 1.2–3.4)
LYMPHOCYTES NFR BLD AUTO: 34.8 % (ref 21–51)
MCH RBC QN AUTO: 33.1 PG (ref 27–31)
MCV RBC AUTO: 97.9 FL (ref 78–98)
MONOCYTES # BLD AUTO: 0.6 THOU/UL (ref 0.11–0.59)
MONOCYTES NFR BLD AUTO: 6.6 % (ref 0–10)
NEUTROPHILS # BLD AUTO: 4.8 THOU/UL (ref 1.4–6.5)
NEUTROPHILS NFR BLD AUTO: 54.9 % (ref 42–75)
PLATELET # BLD AUTO: 214 THOU/UL (ref 130–400)
POTASSIUM SERPL-SCNC: 3.7 MMOL/L (ref 3.5–5.1)
RBC # BLD AUTO: 3.73 MILL/UL (ref 4.2–5.4)
SODIUM SERPL-SCNC: 141 MMOL/L (ref 136–145)
WBC # BLD AUTO: 8.8 THOU/UL (ref 4.8–10.8)

## 2018-07-23 RX ADMIN — INSULIN LISPRO PRN UNIT: 100 INJECTION, SOLUTION INTRAVENOUS; SUBCUTANEOUS at 06:34

## 2018-07-23 RX ADMIN — INSULIN LISPRO PRN UNIT: 100 INJECTION, SOLUTION INTRAVENOUS; SUBCUTANEOUS at 11:49

## 2018-07-23 RX ADMIN — HYDROCODONE BITARTRATE AND ACETAMINOPHEN PRN TAB: 10; 325 TABLET ORAL at 17:13

## 2018-07-23 RX ADMIN — HYDROCODONE BITARTRATE AND ACETAMINOPHEN PRN TAB: 10; 325 TABLET ORAL at 09:23

## 2018-07-23 RX ADMIN — INSULIN LISPRO PRN UNIT: 100 INJECTION, SOLUTION INTRAVENOUS; SUBCUTANEOUS at 18:31

## 2018-07-23 RX ADMIN — Medication SCH: at 21:27

## 2018-07-23 RX ADMIN — HYDROCODONE BITARTRATE AND ACETAMINOPHEN PRN TAB: 10; 325 TABLET ORAL at 21:33

## 2018-07-23 RX ADMIN — INSULIN GLARGINE SCH MLS: 100 INJECTION, SOLUTION SUBCUTANEOUS at 21:29

## 2018-07-23 RX ADMIN — Medication SCH ML: at 08:59

## 2018-07-23 NOTE — PDOC.PN
- Subjective


Encounter Start Date: 07/23/18


Encounter Start Time: 10:10


-: old records requested/rev





Patient seen and examined. No new complaints. No overnight events





pt was on bipap last night, this morning she is more alert





she has broken cpap at home and not using





- Objective


Resuscitation Status: 


 











Resuscitation Status           FULL:Full Resuscitation














MAR Reviewed: Yes


Vital Signs & Weight: 


 Vital Signs (12 hours)











  Temp Pulse Resp BP BP Pulse Ox


 


 07/23/18 11:48  98.2 F  83  19   100/46 L  96


 


 07/23/18 08:58   72   128/89  


 


 07/23/18 08:57   72   120/89  


 


 07/23/18 07:48       97


 


 07/23/18 07:37  98.3 F  72  18   125/65  96


 


 07/23/18 04:24  97.1 F L  61  15   120/62  99


 


 07/23/18 02:20   60    








 Weight











Weight                         336 lb 3.2 oz














I&O: 


 











 07/22/18 07/23/18 07/24/18





 06:59 06:59 06:59


 


Intake Total  250 


 


Output Total  1350 


 


Balance  -1100 











Result Diagrams: 


 07/23/18 03:51





 07/23/18 03:51


Additional Labs: 


 Accuchecks











  07/23/18 07/23/18 07/22/18





  10:49 06:04 20:58


 


POC Glucose  248 H  210 H  233 H











EKG Reviewed by me: Yes (nsr)





Phys Exam





- Physical Examination


Constitutional: NAD


HEENT: PERRLA, moist MMs, sclera anicteric


Neck: no JVD, supple


Respiratory: no wheezing, no rales, no rhonchi


Cardiovascular: RRR, no significant murmur, no rub


Gastrointestinal: soft, non-tender, no distention, positive bowel sounds


morbid obesity+


Musculoskeletal: no edema, pulses present


Neurological: non-focal, normal sensation, moves all 4 limbs


Lymphatic: no nodes


Psychiatric: normal affect, A&O x 3


Skin: no rash, normal turgor





Dx/Plan


(1) Acute metabolic encephalopathy


Code(s): G93.41 - METABOLIC ENCEPHALOPATHY   Status: Resolved   





(2) Anxiety and depression


Code(s): F41.9 - ANXIETY DISORDER, UNSPECIFIED; F32.9 - MAJOR DEPRESSIVE 

DISORDER, SINGLE EPISODE, UNSPECIFIED   Status: Chronic   





(3) Chronic diastolic (congestive) heart failure


Code(s): I50.32 - CHRONIC DIASTOLIC (CONGESTIVE) HEART FAILURE   Status: 

Chronic   





(4) Chronic pain


Code(s): G89.29 - OTHER CHRONIC PAIN   Status: Chronic   





(5) Chronic respiratory failure with hypoxia and hypercapnia


Code(s): J96.11 - CHRONIC RESPIRATORY FAILURE WITH HYPOXIA; J96.12 - CHRONIC 

RESPIRATORY FAILURE WITH HYPERCAPNIA   Status: Chronic   





(6) DM type 2 (diabetes mellitus, type 2)


Status: Chronic   





(7) Diabetic neuropathy


Code(s): E11.40 - TYPE 2 DIABETES MELLITUS WITH DIABETIC NEUROPATHY, UNSP   

Status: Chronic   





(8) Dyslipidemia


Code(s): E78.5 - HYPERLIPIDEMIA, UNSPECIFIED   Status: Chronic   





(9) GERD (gastroesophageal reflux disease)


Code(s): K21.9 - GASTRO-ESOPHAGEAL REFLUX DISEASE WITHOUT ESOPHAGITIS   Status: 

Chronic   





(10) Morbid obesity with BMI of 50.0-59.9, adult


Code(s): E66.01 - MORBID (SEVERE) OBESITY DUE TO EXCESS CALORIES; Z68.43 - BODY 

MASS INDEX (BMI) 50-59.9 , ADULT   Status: Chronic   





(11) Right heart failure


Status: Chronic   





(12) Sleep apnea, obstructive


Code(s): G47.33 - OBSTRUCTIVE SLEEP APNEA (ADULT) (PEDIATRIC)   Status: Chronic

   





- Plan


cont current plan of care, respiratory therapy





* significantly improved with bipap


* she will need sleep study and new cpap machine


* medication reviewed as below


* symptomatic treatment


* today will ambulate


* she has home care and prefer to go to home on discharge


* expecting discharge tomorrow if stable.








Review of Systems





- Review of Systems


Eyes: negative: Pain, Vision Change, Conjunctivae Inflammation, Eyelid 

Inflammation, Redness, Other


ENT: negative: Ear Pain, Ear Discharge, Nose Pain, Nose Discharge, Nose 

Congestion, Mouth Pain, Mouth Swelling, Throat Pain, Throat Swelling, Other


Respiratory: negative: Cough, Dry, Shortness of Breath, Hemoptysis, SOB with 

Excertion, Pleuritic Pain, Sputum, Wheezing


Cardiovascular: negative: chest pain, palpitations, orthopnea, paroxysmal 

nocturnal dyspnea, edema, light headedness, other


Gastrointestinal: negative: Nausea, Vomiting, Abdominal Pain, Diarrhea, 

Constipation, Melena, Hematochezia, Other


Genitourinary: negative: Dysuria, Frequency, Incontinence, Hematuria, Retention

, Other


Musculoskeletal: negative: Neck Pain, Shoulder Pain, Arm Pain, Back Pain, Hand 

Pain, Leg Pain, Foot Pain, Other


Skin: negative: Rash, Lesions, Ciro, Bruising, Other





- Medications/Allergies


Allergies/Adverse Reactions: 


 Allergies











Allergy/AdvReac Type Severity Reaction Status Date / Time


 


morphine Allergy Severe Anaphylaxis Verified 07/23/18 06:51


 


codeine Allergy   Verified 07/23/18 06:51


 


metoclopramide HCl Allergy   Verified 07/23/18 06:51





[From Reglan]     


 


tizanidine [From Zanaflex] Allergy   Verified 07/23/18 06:51











Medications: 


 Current Medications





Acetaminophen (Tylenol)  650 mg PO Q4H PRN


   PRN Reason: Headache/Fever or Pain


   Last Admin: 07/23/18 03:07 Dose:  650 mg


Hydrocodone Bitart/Acetaminophen (Norco 10/325)  1 tab PO Q4H PRN


   PRN Reason: Breakthrough Pain


   Last Admin: 07/23/18 09:23 Dose:  1 tab


Al Hydroxide/Mg Hydroxide (Maalox)  30 ml PO Q6H PRN


   PRN Reason: Heartburn  or Indigestion


Albuterol/Ipratropium (Duoneb)  3 ml NEB [id number] PRN


   PRN Reason: SOB &/or Wheezing


Amlodipine Besylate (Norvasc)  10 mg PO DAILY [facility]


   Last Admin: 07/23/18 08:58 Dose:  10 mg


Artificial Tears (Tears Naturale)  0 drop EA EYE PRN PRN


   PRN Reason: Dry Eyes


Atenolol (Tenormin)  50 mg PO DAILY [facility]


   Last Admin: 07/23/18 08:57 Dose:  50 mg


Cefdinir (Omnicef)  300 mg PO BID [facility]


Dextrose/Water (Dextrose 50%)  25 gm SLOW IVP PRN PRN


   PRN Reason: Hypoglycemia


Enoxaparin Sodium (Lovenox)  40 mg SC 0900 [facility]


   Last Admin: 07/23/18 08:57 Dose:  40 mg


Gabapentin (Neurontin)  600 mg PO BID [facility]


Glucagon (Glucagon)  1 mg IM PRN PRN


   PRN Reason: Hypoglycemia


Guaifenesin (Robitussin Sf)  200 mg PO Q4H PRN


   PRN Reason: Cough


Guaifenesin (Mucinex)  600 mg PO Q12HR [facility]


Hydralazine HCl (Apresoline)  10 mg SLOW IVP Q4H PRN


   PRN Reason: Systolic BP > 180


Dextrose/Water (D5w)  1,000 mls @ 0 mls/hr IV .Q0M PRN; As Directed


   PRN Reason: Hypoglycemia


Insulin Glargine 25 units/ (Miscellaneous Medication)  0.25 mls @ 0 mls/hr SC 

[facility]


   Last Admin: 07/22/18 20:58 Dose:  0.25 mls


Insulin Human Lispro (Humalog)  0 units SC .AGGRESSIVE SLIDING  PRN


   PRN Reason: Aggressive Correctional Scale


   Last Admin: 07/23/18 11:49 Dose:  6 unit


Insulin Human Lispro (Humalog)  0 units SC .BEDTIME SLIDING SC PRN


   PRN Reason: Bedtime Correctional Scale


Levothyroxine Sodium (Synthroid)  50 mcg PO 0600 [facility]


   Last Admin: 07/23/18 06:34 Dose:  50 mcg


Loperamide HCl (Imodium)  2 mg PO PRN PRN


   PRN Reason: Diarrhea/Loose Stools


Magnesium Hydroxide (Milk Of Magnesium)  30 ml PO DAILYPRN PRN


   PRN Reason: Constipation


Mineral Oil/White Petrolatum (Eucerin Cream)  0 gm TOP BIDPRN PRN


   PRN Reason: Dry Skin


Ondansetron HCl (Zofran Odt)  4 mg PO Q6H PRN


   PRN Reason: Nausea/Vomiting


Ondansetron HCl (Zofran)  4 mg IVP Q6H PRN


   PRN Reason: Nausea/Vomiting


   Last Admin: 07/23/18 03:13 Dose:  4 mg


Pantoprazole Sodium (Protonix)  40 mg IVP DAILY [facility]


   Last Admin: 07/23/18 08:58 Dose:  40 mg


Paroxetine HCl (Paxil)  40 mg PO DAILY [facility]


   Last Admin: 07/23/18 08:59 Dose:  40 mg


Phenol (Chloraseptic Spray 180 Ml Bot)  0 ml PO PRN PRN


   PRN Reason: Sore Throat


Pravastatin Sodium (Pravachol)  40 mg PO [facility]


   Last Admin: 07/22/18 20:58 Dose:  40 mg


Senna (Senokot)  2 tab PO HSPRN PRN


   PRN Reason: Constipation


Sodium Chloride (Ocean Nasal Spray 0.65%)  0 ml EA NARE QIDPRN PRN


   PRN Reason: Nasal Congestion


Sodium Chloride (Flush - Normal Saline)  10 ml IVF Q12HR [facility]


   Last Admin: 07/23/18 08:59 Dose:  10 ml


Sodium Chloride (Flush - Normal Saline)  10 ml IVF PRN PRN


   PRN Reason: Saline Flush

## 2018-07-24 VITALS — DIASTOLIC BLOOD PRESSURE: 63 MMHG | SYSTOLIC BLOOD PRESSURE: 123 MMHG

## 2018-07-24 VITALS — TEMPERATURE: 96.4 F

## 2018-07-24 LAB
ANION GAP SERPL CALC-SCNC: 11 MMOL/L (ref 10–20)
BASOPHILS # BLD AUTO: 0 THOU/UL (ref 0–0.2)
BASOPHILS NFR BLD AUTO: 0.2 % (ref 0–1)
BUN SERPL-MCNC: 17 MG/DL (ref 9.8–20.1)
CALCIUM SERPL-MCNC: 9.1 MG/DL (ref 7.8–10.44)
CHLORIDE SERPL-SCNC: 99 MMOL/L (ref 98–107)
CO2 SERPL-SCNC: 34 MMOL/L (ref 22–29)
CREAT CL PREDICTED SERPL C-G-VRATE: 190 ML/MIN (ref 70–130)
EOSINOPHIL # BLD AUTO: 0.3 THOU/UL (ref 0–0.7)
EOSINOPHIL NFR BLD AUTO: 3.7 % (ref 0–10)
GLUCOSE SERPL-MCNC: 196 MG/DL (ref 70–105)
HGB BLD-MCNC: 12.4 G/DL (ref 12–16)
LYMPHOCYTES # BLD: 3.4 THOU/UL (ref 1.2–3.4)
LYMPHOCYTES NFR BLD AUTO: 46.6 % (ref 21–51)
MCH RBC QN AUTO: 32.6 PG (ref 27–31)
MCV RBC AUTO: 99.1 FL (ref 78–98)
MONOCYTES # BLD AUTO: 0.4 THOU/UL (ref 0.11–0.59)
MONOCYTES NFR BLD AUTO: 5.8 % (ref 0–10)
NEUTROPHILS # BLD AUTO: 3.1 THOU/UL (ref 1.4–6.5)
NEUTROPHILS NFR BLD AUTO: 43.6 % (ref 42–75)
PLATELET # BLD AUTO: 197 THOU/UL (ref 130–400)
POTASSIUM SERPL-SCNC: 3.8 MMOL/L (ref 3.5–5.1)
RBC # BLD AUTO: 3.8 MILL/UL (ref 4.2–5.4)
SODIUM SERPL-SCNC: 140 MMOL/L (ref 136–145)
WBC # BLD AUTO: 7.2 THOU/UL (ref 4.8–10.8)

## 2018-07-24 RX ADMIN — HYDROCODONE BITARTRATE AND ACETAMINOPHEN PRN TAB: 10; 325 TABLET ORAL at 09:45

## 2018-07-24 RX ADMIN — Medication SCH ML: at 09:48

## 2018-07-24 RX ADMIN — HYDROCODONE BITARTRATE AND ACETAMINOPHEN PRN TAB: 10; 325 TABLET ORAL at 03:24

## 2018-07-24 RX ADMIN — INSULIN LISPRO PRN UNIT: 100 INJECTION, SOLUTION INTRAVENOUS; SUBCUTANEOUS at 06:40

## 2018-07-24 NOTE — CON
DATE OF CONSULTATION:  07/23/2018

 

HISTORY OF PRESENT ILLNESS:  This is a 62-year-old morbidly obese female who seeks care at [facility].  
She has got multiple doctors she sees there.  In fact, she was recently underwent a cardiac catheteri
zation.  She says the doctor told that the catheterization was normal.

 

She has known history of severe sleep apnea, apparently took an Ativan yesterday and had some kind of
 reaction, presented with mental status change.  All night, she was on the CPAP machine.

 

This morning, awake, alert, and responsive.  She says she is ready to go home.

 

PAST MEDICAL HISTORY:  Extensive history well outlined in the past medical history, is pertinent for 
morbid obesity, coronary artery disease, congestive heart failure, COPD, asthma, fibromyalgia, and ch
ronic pain.

 

PAST SURGICAL HISTORY:  Gallbladder, hernia, tubal ligation.

 

SOCIAL HISTORY:  Alcohol none.  Tobacco none.

 

MEDICATIONS:  List of medicines from home includes a long list including amlodipine 10, Symbicort inh
aler, Neurontin 600, Norco p.r.n., insulin 5 units twice a day, Levemir 25 at bedtime, Ativan 1 mg, P
axil 40, pravastatin 40.

 

ALLERGIES:  MORPHINE, CODEINE, METOPROLOL, ZANAFLEX.

 

SOCIAL HISTORY:  Disabled.

 

REVIEW OF SYSTEMS:  Pertinent for cough productive of green sputum.

 

PHYSICAL EXAMINATION:

GENERAL:  Morbidly obese female.  Awake, alert and responsive.

VITAL SIGNS:  Blood pressure is 120/89, sats are 97% _____ temperature 98.

CHEST:  Decreased breath sounds, no wheezing.

CARDIAC:  Normal S1, S2.  No gallops.

ABDOMEN:  Soft.  No masses.

EXTREMITIES:  No edema.

 

LABORATORY DATA AND IMAGING:  White count 8000, H&H is 12 and 36, platelet count 214.  Electrolytes a
re normal.  X-ray was unremarkable.  CT of the brain was done, which was otherwise unremarkable.

 

IMPRESSION:

1.  Metabolic encephalopathy, resolved.

2.  Morbid obesity.

3.  Sleep apnea with recent cardiac catheterization, negative.

4.  Chronic pain.

5.  Bronchitis.

 

PLAN:  Empiric antibiotics have been initiated along with her home pain medication.  Aggressive PT, s
upportive care.  Hopefully, she can be discharged home in the next 24-48 hours.

 

This is a consultation note of 70 minutes, in which 50% spent in direct patient care.

## 2018-07-24 NOTE — DIS
DATE OF ADMISSION:  07/22/2018

 

DATE OF DISCHARGE:  07/24/2018

 

PRIMARY CARE PHYSICIAN:  Naa Bundy M.D.

 

DISCHARGE DISPOSITION:  Home with home health.

 

PRIMARY DISCHARGE DIAGNOSIS:  Acute metabolic encephalopathy, resolved.

 

SECONDARY DISCHARGE DIAGNOSES:  Obstructive sleep apnea, right-sided heart failure, morbid obesity wi
th body mass index 57, gastroesophageal reflux disease, dyslipidemia, diabetes type 2, diabetic neuro
rodo, chronic respiratory failure with hypoxia and hypercapnia on home oxygen, chronic pain disorder
, history of lupus, fibromyalgia, chronic diastolic heart failure, anxiety and depression.

 

PRIMARY PROCEDURES/OPERATIONS:  None.

 

RADIOLOGICAL INVESTIGATION:  Chest x-ray was unremarkable, but CT brain was negative for any acute in
tracranial process.

 

SIGNIFICANT LABORATORIES:  WBC 7.2, hemoglobin 12.4, platelet 197, CO2 62.1.  Sodium 140, potassium 3
.8, BUN 17, creatinine 0.76 and calcium 9.1.  Cardiac enzymes negative x3.  Liver enzymes normal.  Al
bumin 3.5, BNP 53, TSH 8.69.  Urinalysis unremarkable.  Urine drug screen positive for benzos and opi
ates.  Serum drug screen negative.  Blood culture and urine culture negative.

 

DISCHARGE MEDICATIONS:  Albuterol sulfate 2 puffs q.6 hourly p.r.n., amlodipine 10 mg p.o. daily, ate
nolol 100 mg p.o. daily, Symbicort 2 puff inhalation b.i.d., gabapentin 600 mg p.o. b.i.d., Norco 10 
one tablet q.4 hourly p.r.n., Humalog 5 units subcu t.i.d., Levemir 25 units subcu at bedtime, loraze
bonnie 1 mg p.o. daily, Paxil 40 mg p.o. daily, pravastatin 40 mg p.o. at bedtime, Synthroid 25 mcg p.o.
 daily.

 

CONTRAINDICATIONS:  None.

 

CODE STATUS:  FULL CODE.

 

INPATIENT CONSULTANT:  Dr. Bradley.

 

TEST RESULTS PENDING ON DISCHARGE:  None.

 

ALLERGIES:  MORPHINE, CODEINE, TIZANIDINE, REGLAN.

 

DISCHARGE PLAN:  Post hospital, the patient is instructed to follow up with primary care physician as
 well as pulmonologist for outpatient sleep study.

 

HOSPITAL COURSE:  A 62-year-old female who has chronic pain disorder.  During night time, she took ex
tra Norco for her chronic pain and that is why she became more lethargic and sleepy.  She has underly
ing history of sleep apnea, but she was not using CPAP machine because it was broken.  The patient wa
s less arousable and that is why paramedics were called and patient was brought to emergency room, sh
bronwyn was found with CO2 narcosis.  She was treated with BiPAP with significant improvement.  Overnight, 
we provided patient education to be compliant with the CPAP machine as well as we discussed about how
 to use pain medication.  During this admission, we found with elevated TSH and that is why we starte
d levothyroxine 25 mcg p.o. daily.  Further dose will be adjusted by primary care physician.

 

The patient is doing very well today.  The patient does not want to go to any kind of rehabilitation 
or skilled nursing home placement.  She has significant support from her mother as well as other home
 care provider.  The patient wanted to go home.

 

The patient is seen and examined at bedside today.  Please see my progress note from today for furthe
r details.  The patient is medically stable for discharge today.

 

ADDENDUM:  Dr. Bradley started 300 mg p.o. b.i.d. and Mucinex 600 mg p.o. b.i.d. which are also going to
 prescribed to her on discharge and we are discharging her with levothyroxine 50 mcg p.o. daily, not 
25 mcg p.o. daily.

## 2018-07-24 NOTE — PRG
DATE OF SERVICE:  07/24/2018

 

SUBJECTIVE: This morning, she is better.  She is going home.

 

PHYSICAL EXAMINATION:

VITAL SIGNS:  Sats are 98 on 3 liters, respiratory rate 12, pulse 62, temperature 98, blood pressure 
130/75.

CHEST:  No wheezing, no crackle.

CARDIAC:  Normal S1, S2.  No gallops.

ABDOMEN:  Soft.  No guarding, mass.

 

IMPRESSION:  Morbid obesity, sleep apnea, chronic obstructive pulmonary disease, asthma.  Labs looks 
okay.

 

PLAN:  She is going to be discharged home to be followed [facility]  She can se
e Dr. Gonzalez if she wants for sleep apnea.

## 2018-07-24 NOTE — PDOC.PN
- Subjective


Encounter Start Date: 07/24/18


Encounter Start Time: 09:10





Patient seen and examined. No new complaints. No overnight events





- Objective


Resuscitation Status: 


 











Resuscitation Status           FULL:Full Resuscitation














MAR Reviewed: Yes


Vital Signs & Weight: 


 Vital Signs (12 hours)











  Temp Pulse Resp BP Pulse Ox


 


 07/24/18 08:00  98.5 F  62  12   98


 


 07/24/18 07:42  98.5 F  62  12  135/75  96


 


 07/24/18 04:00  97.5 F L  61  13  130/70  97


 


 07/23/18 23:51  97.5 F L  65  18  141/62 H  96








 Weight











Weight                         332 lb 1.6 oz














I&O: 


 











 07/23/18 07/24/18 07/25/18





 06:59 06:59 06:59


 


Intake Total 250 1590 


 


Output Total 1350 820 


 


Balance -1100 770 











Result Diagrams: 


 07/24/18 03:19





 07/24/18 03:19


Additional Labs: 


 Accuchecks











  07/24/18 07/23/18 07/23/18





  06:20 19:57 17:06


 


POC Glucose  217 H  250 H  211 H














  07/23/18





  10:49


 


POC Glucose  248 H











EKG Reviewed by me: Yes (nsr)





Phys Exam





- Physical Examination


Constitutional: NAD


HEENT: PERRLA, moist MMs, sclera anicteric


Neck: no JVD, supple


Respiratory: no wheezing, no rales, no rhonchi


Cardiovascular: RRR, no significant murmur, no rub


Gastrointestinal: soft, non-tender, no distention, positive bowel sounds


Musculoskeletal: no edema, pulses present


Neurological: non-focal, normal sensation, moves all 4 limbs


Psychiatric: normal affect, A&O x 3


Skin: no rash, normal turgor





Dx/Plan


(1) Acute metabolic encephalopathy


Code(s): G93.41 - METABOLIC ENCEPHALOPATHY   Status: Resolved   





(2) Anxiety and depression


Code(s): F41.9 - ANXIETY DISORDER, UNSPECIFIED; F32.9 - MAJOR DEPRESSIVE 

DISORDER, SINGLE EPISODE, UNSPECIFIED   Status: Chronic   





(3) Chronic diastolic (congestive) heart failure


Code(s): I50.32 - CHRONIC DIASTOLIC (CONGESTIVE) HEART FAILURE   Status: 

Chronic   





(4) Chronic pain


Code(s): G89.29 - OTHER CHRONIC PAIN   Status: Chronic   





(5) Chronic respiratory failure with hypoxia and hypercapnia


Code(s): J96.11 - CHRONIC RESPIRATORY FAILURE WITH HYPOXIA; J96.12 - CHRONIC 

RESPIRATORY FAILURE WITH HYPERCAPNIA   Status: Chronic   





(6) DM type 2 (diabetes mellitus, type 2)


Status: Chronic   





(7) Diabetic neuropathy


Code(s): E11.40 - TYPE 2 DIABETES MELLITUS WITH DIABETIC NEUROPATHY, UNSP   

Status: Chronic   





(8) Dyslipidemia


Code(s): E78.5 - HYPERLIPIDEMIA, UNSPECIFIED   Status: Chronic   





(9) GERD (gastroesophageal reflux disease)


Code(s): K21.9 - GASTRO-ESOPHAGEAL REFLUX DISEASE WITHOUT ESOPHAGITIS   Status: 

Chronic   





(10) Morbid obesity with BMI of 50.0-59.9, adult


Code(s): E66.01 - MORBID (SEVERE) OBESITY DUE TO EXCESS CALORIES; Z68.43 - BODY 

MASS INDEX (BMI) 50-59.9 , ADULT   Status: Chronic   





(11) Right heart failure


Status: Chronic   





(12) Sleep apnea, obstructive


Code(s): G47.33 - OBSTRUCTIVE SLEEP APNEA (ADULT) (PEDIATRIC)   Status: Chronic

   





- Plan


cont current plan of care, continue antibiotics, respiratory therapy





* DORY daley


* medication reviewed as below


* symptomatic treatment


* stable for discharge


* she will follow up with pulmonary for sleep study


* advised to avoid narcotics


* see my discharge summery.








Review of Systems





- Review of Systems


Eyes: negative: Pain, Vision Change, Conjunctivae Inflammation, Eyelid 

Inflammation, Redness, Other


ENT: negative: Ear Pain, Ear Discharge, Nose Pain, Nose Discharge, Nose 

Congestion, Mouth Pain, Mouth Swelling, Throat Pain, Throat Swelling, Other


Respiratory: negative: Cough, Dry, Shortness of Breath, Hemoptysis, SOB with 

Excertion, Pleuritic Pain, Sputum, Wheezing


Cardiovascular: negative: chest pain, palpitations, orthopnea, paroxysmal 

nocturnal dyspnea, edema, light headedness, other


Gastrointestinal: negative: Nausea, Vomiting, Abdominal Pain, Diarrhea, 

Constipation, Melena, Hematochezia, Other


Genitourinary: negative: Dysuria, Frequency, Incontinence, Hematuria, Retention

, Other


Musculoskeletal: negative: Neck Pain, Shoulder Pain, Arm Pain, Back Pain, Hand 

Pain, Leg Pain, Foot Pain, Other


Skin: negative: Rash, Lesions, Ciro, Bruising, Other





- Medications/Allergies


Allergies/Adverse Reactions: 


 Allergies











Allergy/AdvReac Type Severity Reaction Status Date / Time


 


morphine Allergy Severe Anaphylaxis Verified 07/23/18 06:51


 


codeine Allergy   Verified 07/23/18 06:51


 


metoclopramide HCl Allergy   Verified 07/23/18 06:51





[From Reglan]     


 


tizanidine [From Zanaflex] Allergy   Verified 07/23/18 06:51











Medications: 


 Current Medications





Acetaminophen (Tylenol)  650 mg PO Q4H PRN


   PRN Reason: Headache/Fever or Pain


   Last Admin: 07/23/18 03:07 Dose:  650 mg


Hydrocodone Bitart/Acetaminophen (Norco 10/325)  1 tab PO Q4H PRN


   PRN Reason: Breakthrough Pain


   Last Admin: 07/24/18 03:24 Dose:  1 tab


Al Hydroxide/Mg Hydroxide (Maalox)  30 ml PO Q6H PRN


   PRN Reason: Heartburn  or Indigestion


Albuterol/Ipratropium (Duoneb)  3 ml NEB [id number] PRN


   PRN Reason: SOB &/or Wheezing


Amlodipine Besylate (Norvasc)  10 mg PO DAILY [facility]


   Last Admin: 07/23/18 08:58 Dose:  10 mg


Artificial Tears (Tears Naturale)  0 drop EA EYE PRN PRN


   PRN Reason: Dry Eyes


Atenolol (Tenormin)  50 mg PO DAILY [facility]


   Last Admin: 07/23/18 08:57 Dose:  50 mg


Cefdinir (Omnicef)  300 mg PO BID [facility]


   Last Admin: 07/23/18 21:26 Dose:  300 mg


Dextrose/Water (Dextrose 50%)  25 gm SLOW IVP PRN PRN


   PRN Reason: Hypoglycemia


Enoxaparin Sodium (Lovenox)  40 mg SC 0900 [facility]


   Last Admin: 07/23/18 08:57 Dose:  40 mg


Gabapentin (Neurontin)  600 mg PO BID [facility]


   Last Admin: 07/23/18 21:27 Dose:  600 mg


Glucagon (Glucagon)  1 mg IM PRN PRN


   PRN Reason: Hypoglycemia


Guaifenesin (Robitussin Sf)  200 mg PO Q4H PRN


   PRN Reason: Cough


Guaifenesin (Mucinex)  600 mg PO Q12HR [facility]


   Last Admin: 07/23/18 21:27 Dose:  600 mg


Hydralazine HCl (Apresoline)  10 mg SLOW IVP Q4H PRN


   PRN Reason: Systolic BP > 180


Dextrose/Water (D5w)  1,000 mls @ 0 mls/hr IV .Q0M PRN; As Directed


   PRN Reason: Hypoglycemia


Insulin Glargine 25 units/ (Miscellaneous Medication)  0.25 mls @ 0 mls/hr SC 

[facility]


   Last Admin: 07/23/18 21:29 Dose:  0.25 mls


Insulin Human Lispro (Humalog)  0 units SC .AGGRESSIVE SLIDING  PRN


   PRN Reason: Aggressive Correctional Scale


   Last Admin: 07/24/18 06:40 Dose:  6 unit


Insulin Human Lispro (Humalog)  0 units SC .BEDTIME SLIDING SC PRN


   PRN Reason: Bedtime Correctional Scale


   Last Admin: 07/23/18 21:29 Dose:  2 unit


Levothyroxine Sodium (Synthroid)  50 mcg PO 0600 [facility]


   Last Admin: 07/24/18 05:56 Dose:  50 mcg


Loperamide HCl (Imodium)  2 mg PO PRN PRN


   PRN Reason: Diarrhea/Loose Stools


Magnesium Hydroxide (Milk Of Magnesium)  30 ml PO DAILYPRN PRN


   PRN Reason: Constipation


Mineral Oil/White Petrolatum (Eucerin Cream)  0 gm TOP BIDPRN PRN


   PRN Reason: Dry Skin


Ondansetron HCl (Zofran Odt)  4 mg PO Q6H PRN


   PRN Reason: Nausea/Vomiting


Ondansetron HCl (Zofran)  4 mg IVP Q6H PRN


   PRN Reason: Nausea/Vomiting


   Last Admin: 07/23/18 03:13 Dose:  4 mg


Pantoprazole Sodium (Protonix)  40 mg PO DAILY [facility]


Paroxetine HCl (Paxil)  40 mg PO DAILY [facility]


   Last Admin: 07/23/18 08:59 Dose:  40 mg


Phenol (Chloraseptic Spray 180 Ml Bot)  0 ml PO PRN PRN


   PRN Reason: Sore Throat


Pravastatin Sodium (Pravachol)  40 mg PO [facility]


   Last Admin: 07/23/18 21:27 Dose:  40 mg


Senna (Senokot)  2 tab PO HSPRN PRN


   PRN Reason: Constipation


Sodium Chloride (Ocean Nasal Spray 0.65%)  0 ml EA NARE QIDPRN PRN


   PRN Reason: Nasal Congestion


Sodium Chloride (Flush - Normal Saline)  10 ml IVF Q12HR [facility]


   Last Admin: 07/23/18 21:27 Dose:  Not Given


Sodium Chloride (Flush - Normal Saline)  10 ml IVF PRN PRN


   PRN Reason: Saline Flush